# Patient Record
Sex: FEMALE | Race: BLACK OR AFRICAN AMERICAN
[De-identification: names, ages, dates, MRNs, and addresses within clinical notes are randomized per-mention and may not be internally consistent; named-entity substitution may affect disease eponyms.]

---

## 2017-01-03 ENCOUNTER — HOSPITAL ENCOUNTER (EMERGENCY)
Dept: HOSPITAL 35 - ER | Age: 47
Discharge: HOME | End: 2017-01-03
Payer: COMMERCIAL

## 2017-01-03 VITALS — WEIGHT: 202.01 LBS | BODY MASS INDEX: 31.71 KG/M2 | HEIGHT: 67 IN

## 2017-01-03 VITALS — DIASTOLIC BLOOD PRESSURE: 79 MMHG | SYSTOLIC BLOOD PRESSURE: 132 MMHG

## 2017-01-03 DIAGNOSIS — J40: Primary | ICD-10-CM

## 2017-01-03 DIAGNOSIS — Z88.5: ICD-10-CM

## 2017-01-03 DIAGNOSIS — Z88.0: ICD-10-CM

## 2017-01-03 DIAGNOSIS — R51: ICD-10-CM

## 2019-04-22 ENCOUNTER — HOSPITAL ENCOUNTER (EMERGENCY)
Dept: HOSPITAL 35 - ER | Age: 49
Discharge: HOME | End: 2019-04-22
Payer: COMMERCIAL

## 2019-04-22 VITALS — HEIGHT: 67 IN | BODY MASS INDEX: 31.24 KG/M2 | WEIGHT: 199.01 LBS

## 2019-04-22 VITALS — DIASTOLIC BLOOD PRESSURE: 98 MMHG | SYSTOLIC BLOOD PRESSURE: 160 MMHG

## 2019-04-22 DIAGNOSIS — J18.9: Primary | ICD-10-CM

## 2019-04-22 DIAGNOSIS — E11.9: ICD-10-CM

## 2019-04-22 DIAGNOSIS — Z88.0: ICD-10-CM

## 2019-04-22 DIAGNOSIS — Z88.5: ICD-10-CM

## 2019-09-02 ENCOUNTER — HOSPITAL ENCOUNTER (EMERGENCY)
Dept: HOSPITAL 35 - ER | Age: 49
Discharge: HOME | End: 2019-09-02
Payer: COMMERCIAL

## 2019-09-02 VITALS — SYSTOLIC BLOOD PRESSURE: 140 MMHG | DIASTOLIC BLOOD PRESSURE: 107 MMHG

## 2019-09-02 VITALS — WEIGHT: 187 LBS | BODY MASS INDEX: 29.35 KG/M2 | HEIGHT: 67 IN

## 2019-09-02 DIAGNOSIS — Z98.890: ICD-10-CM

## 2019-09-02 DIAGNOSIS — Z88.0: ICD-10-CM

## 2019-09-02 DIAGNOSIS — J32.0: Primary | ICD-10-CM

## 2019-09-02 DIAGNOSIS — Z88.5: ICD-10-CM

## 2019-12-20 ENCOUNTER — HOSPITAL ENCOUNTER (EMERGENCY)
Dept: HOSPITAL 35 - ER | Age: 49
Discharge: HOME | End: 2019-12-20
Payer: COMMERCIAL

## 2019-12-20 VITALS — SYSTOLIC BLOOD PRESSURE: 145 MMHG | DIASTOLIC BLOOD PRESSURE: 78 MMHG

## 2019-12-20 VITALS — HEIGHT: 72 IN | BODY MASS INDEX: 31.15 KG/M2 | WEIGHT: 230.01 LBS

## 2019-12-20 DIAGNOSIS — M25.461: ICD-10-CM
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
## 2019-12-20 DIAGNOSIS — M71.21: ICD-10-CM
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
## 2019-12-20 DIAGNOSIS — Z98.51: ICD-10-CM

## 2019-12-20 DIAGNOSIS — Z88.6: ICD-10-CM

## 2019-12-20 DIAGNOSIS — S86.811A: Primary | ICD-10-CM
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
## 2019-12-20 DIAGNOSIS — Z88.0: ICD-10-CM

## 2020-01-26 ENCOUNTER — HOSPITAL ENCOUNTER (EMERGENCY)
Dept: HOSPITAL 35 - ER | Age: 50
LOS: 1 days | Discharge: HOME | End: 2020-01-27
Payer: COMMERCIAL

## 2020-01-26 VITALS — WEIGHT: 195 LBS | BODY MASS INDEX: 30.61 KG/M2 | HEIGHT: 67 IN

## 2020-01-26 DIAGNOSIS — E87.6: ICD-10-CM

## 2020-01-26 DIAGNOSIS — Z88.0: ICD-10-CM

## 2020-01-26 DIAGNOSIS — I10: Primary | ICD-10-CM

## 2020-01-26 DIAGNOSIS — Z98.51: ICD-10-CM

## 2020-01-26 DIAGNOSIS — Z88.6: ICD-10-CM

## 2020-01-26 LAB
ANION GAP SERPL CALC-SCNC: 8 MMOL/L (ref 7–16)
BUN SERPL-MCNC: 8 MG/DL (ref 7–18)
CALCIUM SERPL-MCNC: 9 MG/DL (ref 8.5–10.1)
CHLORIDE SERPL-SCNC: 101 MMOL/L (ref 98–107)
CO2 SERPL-SCNC: 26 MMOL/L (ref 21–32)
CREAT SERPL-MCNC: 0.8 MG/DL (ref 0.6–1)
ERYTHROCYTE [DISTWIDTH] IN BLOOD BY AUTOMATED COUNT: 14.6 % (ref 10.5–14.5)
GLUCOSE SERPL-MCNC: 145 MG/DL (ref 74–106)
HCT VFR BLD CALC: 39.4 % (ref 37–47)
HGB BLD-MCNC: 12.9 GM/DL (ref 12–15)
MAGNESIUM SERPL-MCNC: 1.9 MG/DL (ref 1.8–2.4)
MCH RBC QN AUTO: 28.2 PG (ref 26–34)
MCHC RBC AUTO-ENTMCNC: 32.8 G/DL (ref 28–37)
MCV RBC: 86 FL (ref 80–100)
PLATELET # BLD: 316 THOU/UL (ref 150–400)
POTASSIUM SERPL-SCNC: 3.4 MMOL/L (ref 3.5–5.1)
RBC # BLD AUTO: 4.58 MIL/UL (ref 4.2–5)
SODIUM SERPL-SCNC: 135 MMOL/L (ref 136–145)
TROPONIN I SERPL-MCNC: <0.06 NG/ML (ref ?–0.06)
WBC # BLD AUTO: 7.4 THOU/UL (ref 4–11)

## 2020-01-27 VITALS — DIASTOLIC BLOOD PRESSURE: 99 MMHG | SYSTOLIC BLOOD PRESSURE: 154 MMHG

## 2020-01-27 NOTE — EKG
[place] [address] [facility]
[place]  [address]
Phone:  [phone]                    ELECTROCARDIOGRAM REPORT      
_______________________________________________________________________________
 
Name:       LILIYA OLMEDO            Room #:                     [facility]#:      [id number]     Account #:      [id number]  
Admission:  20    Attend Phys:                          
Discharge:  20    Date of Birth:  [birth year]  
                                                          Report #: [id number]
   [id number]
_______________________________________________________________________________
THIS REPORT FOR:   //name//                          
 
                         [facility] ED
                                       
Test Date:    2020               Test Time:    22:37:57
Pat Name:     LILIYA OLMEDO         Department:   
Patient ID:   [id number]            Room:          
Gender:       F                        Technician:   JEFFREY
:          1970               Requested By: Rachel Lester
Order Number: [id number] MD:   Ambrose Toscano
                                 Measurements
Intervals                              Axis          
Rate:         97                       P:            41
MA:           162                      QRS:          42
QRSD:         93                       T:            30
QT:           371                                    
QTc:          472                                    
                           Interpretive Statements
Sinus rhythm
No significant abnormality
No previous ECG available for comparison
 
Electronically Signed On 2020 7:58:29 CST by Ambrose Toscano
[email or web address]
 
 
 
 
 
 
 
 
 
 
 
 
 
 
 
 
 
 
 
  <ELECTRONICALLY SIGNED>
   By: Ambrose Toscano MD, [facility]   
  20     0758
D: 20 2237                           _____________________________________
T: 20 2237                           Ambrose Toscano MD, FACC     /EPI

## 2020-01-29 ENCOUNTER — HOSPITAL ENCOUNTER (EMERGENCY)
Dept: HOSPITAL 35 - ER | Age: 50
Discharge: HOME | End: 2020-01-29
Payer: COMMERCIAL

## 2020-01-29 VITALS — DIASTOLIC BLOOD PRESSURE: 85 MMHG | SYSTOLIC BLOOD PRESSURE: 129 MMHG

## 2020-01-29 VITALS — BODY MASS INDEX: 30.61 KG/M2 | HEIGHT: 67 IN | WEIGHT: 195 LBS

## 2020-01-29 DIAGNOSIS — J11.1: Primary | ICD-10-CM

## 2020-01-29 DIAGNOSIS — E11.9: ICD-10-CM

## 2020-01-29 DIAGNOSIS — I10: ICD-10-CM

## 2020-01-29 DIAGNOSIS — Z88.0: ICD-10-CM

## 2020-01-29 DIAGNOSIS — F32.9: ICD-10-CM

## 2020-01-29 DIAGNOSIS — Z88.5: ICD-10-CM

## 2020-01-29 DIAGNOSIS — Z98.890: ICD-10-CM

## 2020-08-22 ENCOUNTER — HOSPITAL ENCOUNTER (EMERGENCY)
Dept: HOSPITAL 35 - ER | Age: 50
Discharge: HOME | End: 2020-08-22
Payer: COMMERCIAL

## 2020-08-22 VITALS — HEIGHT: 67 IN | WEIGHT: 195 LBS | BODY MASS INDEX: 30.61 KG/M2

## 2020-08-22 VITALS — DIASTOLIC BLOOD PRESSURE: 99 MMHG | SYSTOLIC BLOOD PRESSURE: 139 MMHG

## 2020-08-22 DIAGNOSIS — Z88.0: ICD-10-CM

## 2020-08-22 DIAGNOSIS — M54.12: Primary | ICD-10-CM

## 2020-08-22 DIAGNOSIS — Z79.899: ICD-10-CM

## 2020-08-22 DIAGNOSIS — I10: ICD-10-CM

## 2020-08-22 DIAGNOSIS — E11.9: ICD-10-CM

## 2020-08-22 DIAGNOSIS — Z88.5: ICD-10-CM

## 2020-08-24 NOTE — EKG
[facility]
[address]
[place]   [address]                     ELECTROCARDIOGRAM REPORT      
_______________________________________________________________________________
 
Name:       LILIYA OLMEDO            Room #:                     DEP [facility]#:      [id number]                       Account #:      [id number]  
Admission:  20    Attend Phys:                          
Discharge:  20    Date of Birth:  [birth year]  
                                                          Report #: [id number]
                                                                    [id number]
_______________________________________________________________________________
THIS REPORT FOR:  
 
cc:  PLACIDO - No family physician/PCP 
     FAM - No family physician/PCP 
     Ambrose Toscano MD [facility]
THIS REPORT FOR:   //name//                          
 
                         [facility] ED
                                       
Test Date:    2020               Test Time:    18:37:37
Pat Name:     LILIYA OLMEDO         Department:   
Patient ID:   [id number]            Room:          
Gender:                               Technician:   [facility]
:          1970               Requested By: Robyn Calles
Order Number: [id number] MD:   Ambrose Toscano
                                 Measurements
Intervals                              Axis          
Rate:         105                      P:            61
OK:           157                      QRS:          51
QRSD:         93                       T:            -2
QT:           345                                    
QTc:          457                                    
                           Interpretive Statements
Sinus tachycardia
Borderline T abnormalities, inferior leads
Compared to ECG 2020 22:37:57
T-wave abnormality now present
Electronically Signed On 2020 8:51:08 CDT by Ambrose Toscano
[email or web address]
 
 
 
 
 
 
 
 
 
 
 
 
 
 
 
  <ELECTRONICALLY SIGNED>
   By: Ambrose Toscano MD, FACC   
  20     0851
D: 20 1837                           _____________________________________
T: 20 183                           Ambrose Toscano MD, [facility]     /EPI

## 2021-06-25 ENCOUNTER — HOSPITAL ENCOUNTER (EMERGENCY)
Dept: HOSPITAL 35 - ER | Age: 51
Discharge: HOME | End: 2021-06-25
Payer: COMMERCIAL

## 2021-06-25 VITALS — SYSTOLIC BLOOD PRESSURE: 131 MMHG | DIASTOLIC BLOOD PRESSURE: 94 MMHG

## 2021-06-25 VITALS — HEIGHT: 66 IN | WEIGHT: 164.99 LBS | BODY MASS INDEX: 26.52 KG/M2
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
## 2021-06-25 DIAGNOSIS — Z98.51: ICD-10-CM

## 2021-06-25 DIAGNOSIS — I10: ICD-10-CM

## 2021-06-25 DIAGNOSIS — S40.862A: Primary | ICD-10-CM
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
## 2021-06-25 DIAGNOSIS — E11.9: ICD-10-CM
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
## 2021-06-25 DIAGNOSIS — Z98.890: ICD-10-CM
